# Patient Record
Sex: MALE | Race: WHITE
[De-identification: names, ages, dates, MRNs, and addresses within clinical notes are randomized per-mention and may not be internally consistent; named-entity substitution may affect disease eponyms.]

---

## 2022-10-10 ENCOUNTER — HOSPITAL ENCOUNTER (INPATIENT)
Dept: HOSPITAL 54 - ER | Age: 72
LOS: 3 days | Discharge: NURSING, REHAB OR LONG-TERM CARE | DRG: 640 | End: 2022-10-13
Attending: NURSE PRACTITIONER | Admitting: NURSE PRACTITIONER
Payer: COMMERCIAL

## 2022-10-10 VITALS — SYSTOLIC BLOOD PRESSURE: 117 MMHG | DIASTOLIC BLOOD PRESSURE: 77 MMHG

## 2022-10-10 VITALS — BODY MASS INDEX: 28.49 KG/M2 | HEIGHT: 65 IN | WEIGHT: 171 LBS

## 2022-10-10 DIAGNOSIS — R29.6: ICD-10-CM

## 2022-10-10 DIAGNOSIS — F02.80: ICD-10-CM

## 2022-10-10 DIAGNOSIS — E86.0: Primary | ICD-10-CM
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
## 2022-10-10 DIAGNOSIS — Z20.822: ICD-10-CM

## 2022-10-10 DIAGNOSIS — M62.421: ICD-10-CM

## 2022-10-10 DIAGNOSIS — N17.0: ICD-10-CM

## 2022-10-10 DIAGNOSIS — N18.9: ICD-10-CM

## 2022-10-10 DIAGNOSIS — G93.40: ICD-10-CM

## 2022-10-10 DIAGNOSIS — G20: ICD-10-CM

## 2022-10-10 DIAGNOSIS — N28.1: ICD-10-CM

## 2022-10-10 LAB
ALBUMIN SERPL BCP-MCNC: 3.4 G/DL (ref 3.4–5)
ALP SERPL-CCNC: 101 U/L (ref 46–116)
ALT SERPL W P-5'-P-CCNC: 6 U/L (ref 12–78)
AST SERPL W P-5'-P-CCNC: 18 U/L (ref 15–37)
BASOPHILS # BLD AUTO: 0 K/UL (ref 0–0.2)
BASOPHILS NFR BLD AUTO: 0.3 % (ref 0–2)
BILIRUB DIRECT SERPL-MCNC: 0.2 MG/DL (ref 0–0.2)
BILIRUB SERPL-MCNC: 1 MG/DL (ref 0.2–1)
BUN SERPL-MCNC: 27 MG/DL (ref 7–18)
CALCIUM SERPL-MCNC: 8.9 MG/DL (ref 8.5–10.1)
CHLORIDE SERPL-SCNC: 103 MMOL/L (ref 98–107)
CO2 SERPL-SCNC: 28 MMOL/L (ref 21–32)
CREAT SERPL-MCNC: 1.7 MG/DL (ref 0.6–1.3)
EOSINOPHIL NFR BLD AUTO: 1.7 % (ref 0–6)
GLUCOSE SERPL-MCNC: 139 MG/DL (ref 74–106)
HCT VFR BLD AUTO: 40 % (ref 39–51)
HGB BLD-MCNC: 13.4 G/DL (ref 13.5–17.5)
LYMPHOCYTES NFR BLD AUTO: 1.2 K/UL (ref 0.8–4.8)
LYMPHOCYTES NFR BLD AUTO: 15.3 % (ref 20–44)
MCHC RBC AUTO-ENTMCNC: 34 G/DL (ref 31–36)
MCV RBC AUTO: 97 FL (ref 80–96)
MONOCYTES NFR BLD AUTO: 1 K/UL (ref 0.1–1.3)
MONOCYTES NFR BLD AUTO: 12.8 % (ref 2–12)
NEUTROPHILS # BLD AUTO: 5.6 K/UL (ref 1.8–8.9)
NEUTROPHILS NFR BLD AUTO: 69.9 % (ref 43–81)
PLATELET # BLD AUTO: 216 K/UL (ref 150–450)
POTASSIUM SERPL-SCNC: 3.8 MMOL/L (ref 3.5–5.1)
PROT SERPL-MCNC: 6.6 G/DL (ref 6.4–8.2)
RBC # BLD AUTO: 4.14 MIL/UL (ref 4.5–6)
SODIUM SERPL-SCNC: 137 MMOL/L (ref 136–145)
WBC NRBC COR # BLD AUTO: 8 K/UL (ref 4.3–11)

## 2022-10-10 PROCEDURE — G0378 HOSPITAL OBSERVATION PER HR: HCPCS

## 2022-10-10 PROCEDURE — C9803 HOPD COVID-19 SPEC COLLECT: HCPCS

## 2022-10-10 PROCEDURE — A4349 DISPOSABLE MALE EXTERNAL CAT: HCPCS

## 2022-10-10 NOTE — NUR
AUGUSTUS from [facility] c/o neck pain s/p unwitnessed GLF. PLACED ON BED, AAOX4, 
BREATHING EVEN AND UNLABORED SATURATING AT 97%RA.

## 2022-10-11 VITALS — SYSTOLIC BLOOD PRESSURE: 102 MMHG | DIASTOLIC BLOOD PRESSURE: 67 MMHG

## 2022-10-11 VITALS — DIASTOLIC BLOOD PRESSURE: 72 MMHG | SYSTOLIC BLOOD PRESSURE: 122 MMHG

## 2022-10-11 VITALS — SYSTOLIC BLOOD PRESSURE: 119 MMHG | DIASTOLIC BLOOD PRESSURE: 54 MMHG

## 2022-10-11 VITALS — SYSTOLIC BLOOD PRESSURE: 91 MMHG | DIASTOLIC BLOOD PRESSURE: 67 MMHG

## 2022-10-11 LAB
BASOPHILS # BLD AUTO: 0 K/UL (ref 0–0.2)
BASOPHILS NFR BLD AUTO: 0.5 % (ref 0–2)
BUN SERPL-MCNC: 25 MG/DL (ref 7–18)
CALCIUM SERPL-MCNC: 8.5 MG/DL (ref 8.5–10.1)
CHLORIDE SERPL-SCNC: 103 MMOL/L (ref 98–107)
CO2 SERPL-SCNC: 25 MMOL/L (ref 21–32)
CREAT SERPL-MCNC: 1.5 MG/DL (ref 0.6–1.3)
EOSINOPHIL NFR BLD AUTO: 1.8 % (ref 0–6)
GLUCOSE SERPL-MCNC: 116 MG/DL (ref 74–106)
HCT VFR BLD AUTO: 39 % (ref 39–51)
HGB BLD-MCNC: 13.2 G/DL (ref 13.5–17.5)
LYMPHOCYTES NFR BLD AUTO: 1.1 K/UL (ref 0.8–4.8)
LYMPHOCYTES NFR BLD AUTO: 16.1 % (ref 20–44)
MAGNESIUM SERPL-MCNC: 2 MG/DL (ref 1.8–2.4)
MCHC RBC AUTO-ENTMCNC: 34 G/DL (ref 31–36)
MCV RBC AUTO: 96 FL (ref 80–96)
MONOCYTES NFR BLD AUTO: 0.7 K/UL (ref 0.1–1.3)
MONOCYTES NFR BLD AUTO: 10.8 % (ref 2–12)
NEUTROPHILS # BLD AUTO: 4.7 K/UL (ref 1.8–8.9)
NEUTROPHILS NFR BLD AUTO: 70.8 % (ref 43–81)
PHOSPHATE SERPL-MCNC: 3.3 MG/DL (ref 2.5–4.9)
PLATELET # BLD AUTO: 213 K/UL (ref 150–450)
POTASSIUM SERPL-SCNC: 4.3 MMOL/L (ref 3.5–5.1)
RBC # BLD AUTO: 4.06 MIL/UL (ref 4.5–6)
SODIUM SERPL-SCNC: 136 MMOL/L (ref 136–145)
TSH SERPL DL<=0.005 MIU/L-ACNC: 2.14 UIU/ML (ref 0.36–3.74)
WBC NRBC COR # BLD AUTO: 6.6 K/UL (ref 4.3–11)

## 2022-10-11 RX ADMIN — SODIUM CHLORIDE PRN MLS/HR: 9 INJECTION, SOLUTION INTRAVENOUS at 01:27

## 2022-10-11 RX ADMIN — Medication SCH UDTAB: at 20:48

## 2022-10-11 RX ADMIN — Medication SCH UDTAB: at 11:58

## 2022-10-11 RX ADMIN — CLOTRIMAZOLE SCH GM: 1 CREAM TOPICAL at 17:12

## 2022-10-11 RX ADMIN — CLOZAPINE SCH MG: 25 TABLET ORAL at 21:04

## 2022-10-11 RX ADMIN — PANTOPRAZOLE SODIUM SCH MG: 40 TABLET, DELAYED RELEASE ORAL at 09:08

## 2022-10-11 RX ADMIN — Medication SCH UDTAB: at 14:32

## 2022-10-11 RX ADMIN — Medication SCH UDTAB: at 17:12

## 2022-10-11 RX ADMIN — ENTACAPONE SCH MG: 200 TABLET, FILM COATED ORAL at 12:28

## 2022-10-11 RX ADMIN — CLOTRIMAZOLE SCH GM: 1 CREAM TOPICAL at 09:32

## 2022-10-11 RX ADMIN — ENTACAPONE SCH MG: 200 TABLET, FILM COATED ORAL at 15:06

## 2022-10-11 RX ADMIN — Medication SCH EACH: at 17:12

## 2022-10-11 RX ADMIN — ENTACAPONE SCH MG: 200 TABLET, FILM COATED ORAL at 20:48

## 2022-10-11 RX ADMIN — ENTACAPONE SCH MG: 200 TABLET, FILM COATED ORAL at 17:12

## 2022-10-11 RX ADMIN — OXYCODONE HYDROCHLORIDE AND ACETAMINOPHEN SCH MG: 500 TABLET ORAL at 17:12

## 2022-10-11 NOTE — NUR
RN OPENING NOTE



PATIENT AWAKE IN BED RESTING. A/O X2. NO PAIN NOTED AT THIS TIME. ON ROOM AIR, NO DISTRESS 
OR SHORTNESS OF BREATH NOTED. IV ACCESS LAC #20G, INTACT, PATENT AND FLUSHING WELL. FALL AND 
SAFETY MEASURES IN PLACE, BED ALARM ON, BED IN LOW AND LOCK POSITION, CALL LIGHT AND TABLE 
WITHIN EASY REACH, SIDE RAILS UP X2.

## 2022-10-11 NOTE — NUR
WOUND CARE CONSULT: PT PRESENTS WITH INCONTINENCE, AREAS OF SKIN DISCOLORATION AND RASH TO 
GROIN FOLDS/INNER THIGHS, PRESENT ON ADMISSION. RECOMMENDATIONS MADE FOR SKIN PROTECTION. 
DISCUSSED WITH NURSING STAFF. MD IN AGREEMENT WITH PLAN OF CARE.

## 2022-10-11 NOTE — NUR
RN NOTE



CONDOM CATHETER WAS PLACE ON PATIENT AS PER REQUEST OF WOUND CARE NURSE. CHARGE NURSE AWARE, 
WILL CONTINUE TO MONITOR

## 2022-10-11 NOTE — NUR
RN NOTE



FAMILY GOT HOME MEDICATION NOURIANZ. MEDICATION NOURIANZ 20MG #19 TABLETS WAS GIVEN TO 
PHARMACY. CHARGE NURSE AWARE.

## 2022-10-11 NOTE — NUR
RN OPENING NOTE



PATIENT AWAKE IN BED RESTING. A/O X2. NO PAIN NOTED AT THIS TIME. ON ROOM AIR, NO DISTRESS 
OR SHORTNESS OF BREATH NOTED. IV ACCESS LAC #20G, INTACT, PATENT AND FLUSHING WELL. FALL AND 
SAFETY MEASURES IN PLACE, BED ALARM ON, BED IN LOW AND LOCK POSITION, CALL LIGHT AND TABLE 
WITHIN EASY REACH, SIDE RAILS UP X2. WILL CONTINUE TO MONITOR.

## 2022-10-11 NOTE — NUR
RN CLOSING NOTE



PATIENT AWAKE IN BED RESTING. A/O X2. NO PAIN NOTED AT THIS TIME. ON ROOM AIR, NO DISTRESS 
OR SHORTNESS OF BREATH NOTED. IV ACCESS LAC #20G, INTACT, PATENT AND FLUSHING WELL. SCHEDULE 
MEDICATIONS ADMINISTERED. FALL AND SAFETY MEASURES IN PLACE, BED ALARM ON, BED IN LOW AND 
LOCK POSITION, CALL LIGHT AND TABLE WITHIN EASY REACH, SIDE RAILS UP X2. WILL ENDORSE TO 
NIGHT .

## 2022-10-11 NOTE — NUR
RN CLOSING NOTE 



PT ASLEEP IN BED NO DISTRESS OR PAIN NOTED. PT RUINING [id number] TOLERATING WELL. PT KEPT 
CLEAN AND DRY AT ALL TIMES. ALL NEEDS MET. CALL LIGHT WITHIN REACH. TABLE WITHIN REACH. BED 
IN LOW LOCKED POSITION. HOB ELEVATED FOR SAFETY. WILL ENDORSE CARE TO DAY SHIFT NURSE.

## 2022-10-11 NOTE — NUR
SHARON NOTES



PT ARRIVED TO UNIT  

-------------------------------------------------------------------------------

Addendum: 10/11/22 at 0105 by JEREMIAS STARK RN

-------------------------------------------------------------------------------

PT ARRIVED TO UNIT VIA GURNEY. PT A/O X 2 PT VERY FORGETFUL AND CONFUSED AT TIMES BUT IS 
ABLE TO VERBALIZE HIS NEEDS. PT IN NO PAIN OR DISTRESS AT THIS TIME ON ROOM AIR TOLERATING 
WELL. PT NOTED WITH IV ACCESS ON THE LAC 320G FLUSHING WELL. PT CONNECTED TO NS @75ML/HR 
TOLERATING WELL.PT PT NOTED WITH GENERALIZED REDNESS PHOTOS TAKEN AND PLACE IN CHART WOUND 
CONSULT ORDERED. PT ORIENTED TO ROOM AND UNIT CALL LIGHT WITHIN REACH. TABLE WITHIN REACH. 
BED ON LOW POSITION AND LOCKED IN PLACE. HOB ELEVATED FOR SAFETY BED ALARM ON. ALL NEEDS MET 
AT THIS TIME. NO PT INFORMATION PROVIDED BY [facility] TRIED CALLING X4 TIMES 
NO ANSWER. WILL ENDORSE OT DAY SHIFT TO FOLLOW UP IF UNABLE TO OBTAIN INFORMATION.

## 2022-10-12 VITALS — DIASTOLIC BLOOD PRESSURE: 64 MMHG | SYSTOLIC BLOOD PRESSURE: 98 MMHG

## 2022-10-12 VITALS — SYSTOLIC BLOOD PRESSURE: 96 MMHG | DIASTOLIC BLOOD PRESSURE: 59 MMHG

## 2022-10-12 VITALS — DIASTOLIC BLOOD PRESSURE: 56 MMHG | SYSTOLIC BLOOD PRESSURE: 106 MMHG

## 2022-10-12 LAB
BUN SERPL-MCNC: 16 MG/DL (ref 7–18)
CALCIUM SERPL-MCNC: 8.2 MG/DL (ref 8.5–10.1)
CHLORIDE SERPL-SCNC: 108 MMOL/L (ref 98–107)
CO2 SERPL-SCNC: 25 MMOL/L (ref 21–32)
CREAT SERPL-MCNC: 1.3 MG/DL (ref 0.6–1.3)
GLUCOSE SERPL-MCNC: 93 MG/DL (ref 74–106)
POTASSIUM SERPL-SCNC: 3.8 MMOL/L (ref 3.5–5.1)
SODIUM SERPL-SCNC: 139 MMOL/L (ref 136–145)

## 2022-10-12 RX ADMIN — THERA TABS SCH UDTAB: TAB at 08:50

## 2022-10-12 RX ADMIN — LIDOCAINE SCH EA: 50 PATCH CUTANEOUS at 08:51

## 2022-10-12 RX ADMIN — Medication SCH UDTAB: at 15:05

## 2022-10-12 RX ADMIN — SODIUM CHLORIDE PRN MLS/HR: 9 INJECTION, SOLUTION INTRAVENOUS at 23:52

## 2022-10-12 RX ADMIN — PANTOPRAZOLE SODIUM SCH MG: 40 TABLET, DELAYED RELEASE ORAL at 08:51

## 2022-10-12 RX ADMIN — DONEPEZIL HYDROCHLORIDE SCH MG: 5 TABLET ORAL at 08:50

## 2022-10-12 RX ADMIN — ENTACAPONE SCH MG: 200 TABLET, FILM COATED ORAL at 17:44

## 2022-10-12 RX ADMIN — ENTACAPONE SCH MG: 200 TABLET, FILM COATED ORAL at 11:16

## 2022-10-12 RX ADMIN — VITAMIN D, TAB 1000IU (100/BT) SCH UNIT: 25 TAB at 08:50

## 2022-10-12 RX ADMIN — ENTACAPONE SCH MG: 200 TABLET, FILM COATED ORAL at 06:05

## 2022-10-12 RX ADMIN — Medication SCH EA: at 08:49

## 2022-10-12 RX ADMIN — CLOTRIMAZOLE SCH GM: 1 CREAM TOPICAL at 17:45

## 2022-10-12 RX ADMIN — Medication SCH UDTAB: at 11:16

## 2022-10-12 RX ADMIN — OXYCODONE HYDROCHLORIDE AND ACETAMINOPHEN SCH MG: 500 TABLET ORAL at 17:44

## 2022-10-12 RX ADMIN — Medication SCH UDTAB: at 17:44

## 2022-10-12 RX ADMIN — Medication SCH UDTAB: at 06:05

## 2022-10-12 RX ADMIN — ATORVASTATIN CALCIUM SCH MG: 40 TABLET, FILM COATED ORAL at 08:50

## 2022-10-12 RX ADMIN — CLOZAPINE SCH MG: 25 TABLET ORAL at 21:27

## 2022-10-12 RX ADMIN — Medication SCH UDTAB: at 21:27

## 2022-10-12 RX ADMIN — CLOTRIMAZOLE SCH GM: 1 CREAM TOPICAL at 08:50

## 2022-10-12 RX ADMIN — OXYCODONE HYDROCHLORIDE AND ACETAMINOPHEN SCH MG: 500 TABLET ORAL at 08:50

## 2022-10-12 RX ADMIN — Medication SCH MG: at 08:51

## 2022-10-12 RX ADMIN — ENTACAPONE SCH MG: 200 TABLET, FILM COATED ORAL at 21:27

## 2022-10-12 RX ADMIN — Medication SCH EACH: at 08:50

## 2022-10-12 RX ADMIN — Medication SCH EACH: at 17:44

## 2022-10-12 RX ADMIN — ENTACAPONE SCH MG: 200 TABLET, FILM COATED ORAL at 15:05

## 2022-10-12 NOTE — NUR
MS RN OPENING NOTE



PATIENT AWAKE IN BED RESTING. A/O X2. NO PAIN NOTED AT THIS TIME. ON RA, TOLERATING WELL, NO 
S/S OF ACUTE DISTRESS,  IV ACCESS LAC #20G, INTACT, PATENT RUNNING NS@75ML/HR.. FALL AND 
SAFETY MEASURES IN PLACE, BED ALARM ON, BED IN LOW AND LOCK POSITION, CALL LIGHT AND TABLE 
WITHIN EASY REACH, SIDE RAILS UP X2. WILL CONTINUE TO MONITOR THROUGHOUT SHIFT.

## 2022-10-12 NOTE — NUR
RN CLOSING NOTE



PATIENT AWAKE IN BED RESTING. A/O X2. NO PAIN NOTED AT THIS TIME. ON ROOM AIR, NO DISTRESS 
OR SHORTNESS OF BREATH NOTED. IV ACCESS LAC #20G, INTACT, PATENT AND FLUSHING WELL. FALL AND 
SAFETY MEASURES IN PLACE, BED ALARM ON, BED IN LOW AND LOCK POSITION, CALL LIGHT AND TABLE 
WITHIN EASY REACH, SIDE RAILS UP X2.

## 2022-10-12 NOTE — NUR
RN CLOSING NOTE



PATIENT AWAKE IN BED RESTING. A/O X2. NO PAIN NOTED AT THIS TIME. ON ROOM AIR, NO DISTRESS 
OR SHORTNESS OF BREATH NOTED. IV ACCESS LAC #20G, INTACT, PATENT AND FLUSHING WELL. SCHEDULE 
MEDICATIONS ADMINISTERED. PATIENT WAS TURNED AND REPOSITIONED PER PROTOCOL. FALL AND SAFETY 
MEASURES IN PLACE, BED ALARM ON, BED IN LOW AND LOCK POSITION, CALL LIGHT AND TABLE WITHIN 
EASY REACH, SIDE RAILS UP X2. WILL ENDORSE TO NIGHT

## 2022-10-13 VITALS — DIASTOLIC BLOOD PRESSURE: 62 MMHG | SYSTOLIC BLOOD PRESSURE: 129 MMHG

## 2022-10-13 VITALS — DIASTOLIC BLOOD PRESSURE: 74 MMHG | SYSTOLIC BLOOD PRESSURE: 124 MMHG

## 2022-10-13 RX ADMIN — Medication SCH UDTAB: at 11:14

## 2022-10-13 RX ADMIN — DONEPEZIL HYDROCHLORIDE SCH MG: 5 TABLET ORAL at 08:27

## 2022-10-13 RX ADMIN — ENTACAPONE SCH MG: 200 TABLET, FILM COATED ORAL at 06:50

## 2022-10-13 RX ADMIN — Medication SCH UDTAB: at 06:50

## 2022-10-13 RX ADMIN — Medication SCH EACH: at 08:26

## 2022-10-13 RX ADMIN — ATORVASTATIN CALCIUM SCH MG: 40 TABLET, FILM COATED ORAL at 08:27

## 2022-10-13 RX ADMIN — PANTOPRAZOLE SODIUM SCH MG: 40 TABLET, DELAYED RELEASE ORAL at 07:17

## 2022-10-13 RX ADMIN — THERA TABS SCH UDTAB: TAB at 08:26

## 2022-10-13 RX ADMIN — Medication SCH MG: at 08:26

## 2022-10-13 RX ADMIN — Medication SCH EA: at 08:31

## 2022-10-13 RX ADMIN — ENTACAPONE SCH MG: 200 TABLET, FILM COATED ORAL at 11:14

## 2022-10-13 RX ADMIN — CLOTRIMAZOLE SCH GM: 1 CREAM TOPICAL at 11:15

## 2022-10-13 RX ADMIN — OXYCODONE HYDROCHLORIDE AND ACETAMINOPHEN SCH MG: 500 TABLET ORAL at 08:26

## 2022-10-13 RX ADMIN — LIDOCAINE SCH EA: 50 PATCH CUTANEOUS at 08:27

## 2022-10-13 RX ADMIN — VITAMIN D, TAB 1000IU (100/BT) SCH UNIT: 25 TAB at 08:26

## 2022-10-13 NOTE — NUR
RN NOTES

FIANCE OF THE PATIENT CALLED AT 1530 AND ASKING FOR THE SHOES. I STATED PERSONALLY HANDED 
THE BLACK TENNIS SHOES IN A WHITE COLOR PAPER BAG IN A BELONGING PLASTIC BAG TO THE 
TRANSPORTATION STAFF.

## 2022-10-13 NOTE — NUR
MS RN OPENING NOTE



RECEIVED PATIENT AWAKE IN BED . A/O X2. NO PAIN NOTED AT THIS TIME. ON ROOM AIR, NO DISTRESS 
OR SHORTNESS OF BREATH NOTED. IV ACCESS LAC #20G, INTACT, PATENT AND FLUSHING WELL. ON 
CONTINUOUS NS AT 75 ML/HR. FALL AND SAFETY MEASURES IN PLACE, BED ALARM ON, BED IN LOW AND 
LOCK POSITION, CALL LIGHT AND TABLE WITHIN EASY REACH, SIDE RAILS UP X2. WILL CONTINUE TO 
MONITOR.

## 2022-10-13 NOTE — NUR
RN NOTES

[facility]BER [phone] AT 2454 AND GAVE REPORT TO 
BOAZ. THE PATIENT WILL BE ADMITTED TO ROOM 29 B.

## 2022-10-13 NOTE — NUR
MS RN CLOSING NOTE



PATIENT AWAKE IN BED RESTING. A/O X2. NO PAIN NOTED AT THIS TIME. ON RA, TOLERATING WELL, NO 
S/S OF ACUTE DISTRESS,  IV ACCESS LAC #20G, INTACT, PATENT RUNNING NS@75ML/HR..ALL DUE MEDS 
GIVEN. FALL AND SAFETY MEASURES IN PLACE, BED ALARM ON, BED IN LOW AND LOCK POSITION, CALL 
LIGHT AND TABLE WITHIN EASY REACH, SIDE RAILS UP X2. WILL CONTINUE ENDORSE TO MORNING SHIFT

## 2022-10-13 NOTE — NUR
RN NOTES

DISCHARGE PATIENT IN STABLE CONDITION WITH STABLE VITAL SIGNS. NO PAIN NOTED. NO SOB NOTED. 
NO DISTRESS NOTED. ALL THE DISCHARGE INSTRUCTION GIVEN TO THE [facility] NURSE 
BOAZ. ALL THE BELONGINGS WHICH WERE CELL PHONE, , 1 PAIR OF BLACK TENNIS SHOES IN 
A WHITE PAPER BAG, SHIRT, PANTS, AND SOCKS GIVEN TO THE TRANSPORTATION STAFF. ALL THE 
BELONGINGS ACCOUNTED AND SIGNED WITH 2 NURSE WITNESS. IV SITE REMOVED COVERED WITH DRY 
DRESSING. NO BLEEDING NOTED. ID BAND REMOVED. PATIENT LEFT HOSPITAL IN STABLE CONDITION WITH 
STABLE VITAL SIGNS AT 1335 PM. MD AND CHARGE NURSE AWARE OF THE DISCHARGE.

## 2022-12-17 ENCOUNTER — HOSPITAL ENCOUNTER (EMERGENCY)
Dept: HOSPITAL 54 - ER | Age: 72
Discharge: NURSING, REHAB OR LONG-TERM CARE | End: 2022-12-17
Payer: COMMERCIAL

## 2022-12-17 VITALS — BODY MASS INDEX: 22.91 KG/M2 | HEIGHT: 67 IN | WEIGHT: 146 LBS

## 2022-12-17 VITALS — DIASTOLIC BLOOD PRESSURE: 68 MMHG | SYSTOLIC BLOOD PRESSURE: 101 MMHG

## 2022-12-17 DIAGNOSIS — Z79.899: ICD-10-CM

## 2022-12-17 DIAGNOSIS — Z87.440: ICD-10-CM

## 2022-12-17 DIAGNOSIS — Z79.82: ICD-10-CM

## 2022-12-17 DIAGNOSIS — F02.82: ICD-10-CM

## 2022-12-17 DIAGNOSIS — G20: Primary | ICD-10-CM

## 2022-12-17 DIAGNOSIS — Z20.822: ICD-10-CM

## 2022-12-17 DIAGNOSIS — R53.83: ICD-10-CM

## 2022-12-17 LAB
ALBUMIN SERPL BCP-MCNC: 3.5 G/DL (ref 3.4–5)
ALP SERPL-CCNC: 80 U/L (ref 46–116)
ALT SERPL W P-5'-P-CCNC: < 6 U/L (ref 12–78)
APAP SERPL-MCNC: 0 UG/ML (ref 10–30)
AST SERPL W P-5'-P-CCNC: 21 U/L (ref 15–37)
BASOPHILS # BLD AUTO: 0 K/UL (ref 0–0.2)
BASOPHILS NFR BLD AUTO: 0.3 % (ref 0–2)
BILIRUB DIRECT SERPL-MCNC: 0.2 MG/DL (ref 0–0.2)
BILIRUB SERPL-MCNC: 0.7 MG/DL (ref 0.2–1)
BILIRUB UR QL STRIP: NEGATIVE
BUN SERPL-MCNC: 19 MG/DL (ref 7–18)
CALCIUM SERPL-MCNC: 8.4 MG/DL (ref 8.5–10.1)
CHLORIDE SERPL-SCNC: 105 MMOL/L (ref 98–107)
CO2 SERPL-SCNC: 28 MMOL/L (ref 21–32)
COLOR UR: YELLOW
CREAT SERPL-MCNC: 1.2 MG/DL (ref 0.6–1.3)
EOSINOPHIL NFR BLD AUTO: 8.7 % (ref 0–6)
ETHANOL SERPL-MCNC: < 3 MG/DL (ref 0–0)
GLUCOSE SERPL-MCNC: 95 MG/DL (ref 74–106)
GLUCOSE UR STRIP-MCNC: NEGATIVE MG/DL
HCT VFR BLD AUTO: 41 % (ref 39–51)
HGB BLD-MCNC: 13.5 G/DL (ref 13.5–17.5)
LEUKOCYTE ESTERASE UR QL STRIP: NEGATIVE
LYMPHOCYTES NFR BLD AUTO: 1.2 K/UL (ref 0.8–4.8)
LYMPHOCYTES NFR BLD AUTO: 13.1 % (ref 20–44)
MCHC RBC AUTO-ENTMCNC: 33 G/DL (ref 31–36)
MCV RBC AUTO: 97 FL (ref 80–96)
MONOCYTES NFR BLD AUTO: 0.8 K/UL (ref 0.1–1.3)
MONOCYTES NFR BLD AUTO: 8.5 % (ref 2–12)
NEUTROPHILS # BLD AUTO: 6.2 K/UL (ref 1.8–8.9)
NEUTROPHILS NFR BLD AUTO: 69.4 % (ref 43–81)
NITRITE UR QL STRIP: NEGATIVE
PH UR STRIP: 6 [PH] (ref 5–8)
PLATELET # BLD AUTO: 217 K/UL (ref 150–450)
POTASSIUM SERPL-SCNC: 4 MMOL/L (ref 3.5–5.1)
PROT SERPL-MCNC: 6.7 G/DL (ref 6.4–8.2)
PROT UR QL STRIP: NEGATIVE MG/DL
RBC # BLD AUTO: 4.21 MIL/UL (ref 4.5–6)
RBC #/AREA URNS HPF: (no result) /HPF (ref 0–2)
SODIUM SERPL-SCNC: 139 MMOL/L (ref 136–145)
UROBILINOGEN UR STRIP-MCNC: 1 EU/DL
WBC #/AREA URNS HPF: (no result) /HPF (ref 0–3)
WBC NRBC COR # BLD AUTO: 8.9 K/UL (ref 4.3–11)

## 2022-12-17 PROCEDURE — G0480 DRUG TEST DEF 1-7 CLASSES: HCPCS

## 2022-12-17 PROCEDURE — 70450 CT HEAD/BRAIN W/O DYE: CPT

## 2022-12-17 PROCEDURE — 87081 CULTURE SCREEN ONLY: CPT

## 2022-12-17 PROCEDURE — 80076 HEPATIC FUNCTION PANEL: CPT

## 2022-12-17 PROCEDURE — 36415 COLL VENOUS BLD VENIPUNCTURE: CPT

## 2022-12-17 PROCEDURE — 80320 DRUG SCREEN QUANTALCOHOLS: CPT

## 2022-12-17 PROCEDURE — 81001 URINALYSIS AUTO W/SCOPE: CPT

## 2022-12-17 PROCEDURE — 99284 EMERGENCY DEPT VISIT MOD MDM: CPT

## 2022-12-17 PROCEDURE — C9803 HOPD COVID-19 SPEC COLLECT: HCPCS

## 2022-12-17 PROCEDURE — 80048 BASIC METABOLIC PNL TOTAL CA: CPT

## 2022-12-17 PROCEDURE — 85025 COMPLETE CBC W/AUTO DIFF WBC: CPT

## 2022-12-17 PROCEDURE — 80307 DRUG TEST PRSMV CHEM ANLYZR: CPT

## 2022-12-17 PROCEDURE — 87426 SARSCOV CORONAVIRUS AG IA: CPT

## 2022-12-17 PROCEDURE — 80143 DRUG ASSAY ACETAMINOPHEN: CPT

## 2022-12-17 NOTE — NUR
REPORT GIVEN TO [facility]. [facility] AT PT'S 
BEDSIDE

## 2023-02-02 ENCOUNTER — HOSPITAL ENCOUNTER (EMERGENCY)
Dept: HOSPITAL 54 - ER | Age: 73
Discharge: HOME | End: 2023-02-02
Payer: MEDICARE

## 2023-02-02 VITALS — WEIGHT: 154 LBS | HEIGHT: 68 IN | BODY MASS INDEX: 23.34 KG/M2

## 2023-02-02 VITALS — SYSTOLIC BLOOD PRESSURE: 112 MMHG | DIASTOLIC BLOOD PRESSURE: 66 MMHG

## 2023-02-02 DIAGNOSIS — Z79.899: ICD-10-CM

## 2023-02-02 DIAGNOSIS — N30.91: Primary | ICD-10-CM

## 2023-02-02 DIAGNOSIS — G20: ICD-10-CM

## 2023-02-02 DIAGNOSIS — Z98.890: ICD-10-CM

## 2023-02-02 DIAGNOSIS — R10.12: ICD-10-CM

## 2023-02-02 LAB
ALBUMIN SERPL BCP-MCNC: 3.1 G/DL (ref 3.4–5)
ALP SERPL-CCNC: 104 U/L (ref 46–116)
ALT SERPL W P-5'-P-CCNC: 11 U/L (ref 12–78)
AST SERPL W P-5'-P-CCNC: 19 U/L (ref 15–37)
BASOPHILS # BLD AUTO: 0.1 K/UL (ref 0–0.2)
BASOPHILS NFR BLD AUTO: 0.8 % (ref 0–2)
BILIRUB DIRECT SERPL-MCNC: 0.1 MG/DL (ref 0–0.2)
BILIRUB SERPL-MCNC: 0.5 MG/DL (ref 0.2–1)
BILIRUB UR QL STRIP: (no result)
BUN SERPL-MCNC: 23 MG/DL (ref 7–18)
CALCIUM SERPL-MCNC: 8.4 MG/DL (ref 8.5–10.1)
CHLORIDE SERPL-SCNC: 105 MMOL/L (ref 98–107)
CO2 SERPL-SCNC: 25 MMOL/L (ref 21–32)
COLOR UR: YELLOW
CREAT SERPL-MCNC: 1.5 MG/DL (ref 0.6–1.3)
DEPRECATED SQUAMOUS URNS QL MICRO: (no result) /HPF
EOSINOPHIL NFR BLD AUTO: 16.1 % (ref 0–6)
GLUCOSE SERPL-MCNC: 90 MG/DL (ref 74–106)
GLUCOSE UR STRIP-MCNC: NEGATIVE MG/DL
HCT VFR BLD AUTO: 43 % (ref 39–51)
HGB BLD-MCNC: 14.2 G/DL (ref 13.5–17.5)
LEUKOCYTE ESTERASE UR QL STRIP: (no result)
LIPASE SERPL-CCNC: 76 U/L (ref 73–393)
LYMPHOCYTES NFR BLD AUTO: 1.5 K/UL (ref 0.8–4.8)
LYMPHOCYTES NFR BLD AUTO: 11 % (ref 20–44)
MCHC RBC AUTO-ENTMCNC: 33 G/DL (ref 31–36)
MCV RBC AUTO: 95 FL (ref 80–96)
MONOCYTES NFR BLD AUTO: 1.5 K/UL (ref 0.1–1.3)
MONOCYTES NFR BLD AUTO: 11.4 % (ref 2–12)
NEUTROPHILS # BLD AUTO: 8.1 K/UL (ref 1.8–8.9)
NEUTROPHILS NFR BLD AUTO: 60.7 % (ref 43–81)
NITRITE UR QL STRIP: NEGATIVE
PH UR STRIP: 8.5 [PH] (ref 5–8)
PLATELET # BLD AUTO: 222 K/UL (ref 150–450)
POTASSIUM SERPL-SCNC: 4.2 MMOL/L (ref 3.5–5.1)
PROT SERPL-MCNC: 6.6 G/DL (ref 6.4–8.2)
PROT UR QL STRIP: (no result) MG/DL
RBC # BLD AUTO: 4.53 MIL/UL (ref 4.5–6)
RBC #/AREA URNS HPF: (no result) /HPF (ref 0–2)
SODIUM SERPL-SCNC: 138 MMOL/L (ref 136–145)
UROBILINOGEN UR STRIP-MCNC: 0.2 EU/DL
WBC #/AREA URNS HPF: (no result) /HPF
WBC #/AREA URNS HPF: (no result) /HPF (ref 0–3)
WBC NRBC COR # BLD AUTO: 13.3 K/UL (ref 4.3–11)

## 2023-04-27 ENCOUNTER — HOSPITAL ENCOUNTER (INPATIENT)
Dept: HOSPITAL 54 - ER | Age: 73
LOS: 4 days | Discharge: NURSING, REHAB OR LONG-TERM CARE | DRG: 871 | End: 2023-05-01
Attending: NURSE PRACTITIONER | Admitting: NURSE PRACTITIONER
Payer: MEDICARE

## 2023-04-27 VITALS — DIASTOLIC BLOOD PRESSURE: 69 MMHG | SYSTOLIC BLOOD PRESSURE: 118 MMHG

## 2023-04-27 VITALS — BODY MASS INDEX: 25.05 KG/M2 | WEIGHT: 175 LBS | HEIGHT: 70 IN
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
## 2023-04-27 DIAGNOSIS — D68.59: ICD-10-CM

## 2023-04-27 DIAGNOSIS — T78.40XA: ICD-10-CM

## 2023-04-27 DIAGNOSIS — B96.89: ICD-10-CM

## 2023-04-27 DIAGNOSIS — Z79.899: ICD-10-CM

## 2023-04-27 DIAGNOSIS — R62.7: ICD-10-CM

## 2023-04-27 DIAGNOSIS — J15.9: ICD-10-CM

## 2023-04-27 DIAGNOSIS — Z98.890: ICD-10-CM

## 2023-04-27 DIAGNOSIS — N39.0: ICD-10-CM

## 2023-04-27 DIAGNOSIS — E44.0: ICD-10-CM

## 2023-04-27 DIAGNOSIS — A41.9: Primary | ICD-10-CM

## 2023-04-27 DIAGNOSIS — E11.9: ICD-10-CM

## 2023-04-27 DIAGNOSIS — E88.09: ICD-10-CM

## 2023-04-27 DIAGNOSIS — F02.80: ICD-10-CM

## 2023-04-27 DIAGNOSIS — R79.89: ICD-10-CM

## 2023-04-27 DIAGNOSIS — G93.41: ICD-10-CM

## 2023-04-27 DIAGNOSIS — J69.0: ICD-10-CM

## 2023-04-27 DIAGNOSIS — M62.421: ICD-10-CM

## 2023-04-27 DIAGNOSIS — I48.92: ICD-10-CM

## 2023-04-27 DIAGNOSIS — U09.9: ICD-10-CM

## 2023-04-27 DIAGNOSIS — Z88.1: ICD-10-CM

## 2023-04-27 DIAGNOSIS — Z79.82: ICD-10-CM

## 2023-04-27 DIAGNOSIS — B86: ICD-10-CM

## 2023-04-27 DIAGNOSIS — E87.6: ICD-10-CM

## 2023-04-27 DIAGNOSIS — Z20.822: ICD-10-CM

## 2023-04-27 DIAGNOSIS — Z74.09: ICD-10-CM

## 2023-04-27 DIAGNOSIS — K57.30: ICD-10-CM

## 2023-04-27 DIAGNOSIS — G20: ICD-10-CM

## 2023-04-27 DIAGNOSIS — I48.91: ICD-10-CM

## 2023-04-27 LAB
ALBUMIN SERPL BCP-MCNC: 3.2 G/DL (ref 3.4–5)
ALP SERPL-CCNC: 107 U/L (ref 46–116)
ALT SERPL W P-5'-P-CCNC: 10 U/L (ref 12–78)
AMMONIA PLAS-SCNC: 21 UMOL/L (ref 11–32)
APAP SERPL-MCNC: 0 UG/ML (ref 10–30)
AST SERPL W P-5'-P-CCNC: 18 U/L (ref 15–37)
BASOPHILS # BLD AUTO: 0.1 K/UL (ref 0–0.2)
BASOPHILS NFR BLD AUTO: 0.6 % (ref 0–2)
BILIRUB DIRECT SERPL-MCNC: 0.1 MG/DL (ref 0–0.2)
BILIRUB SERPL-MCNC: 0.7 MG/DL (ref 0.2–1)
BILIRUB UR QL STRIP: NEGATIVE
BUN SERPL-MCNC: 19 MG/DL (ref 7–18)
CALCIUM SERPL-MCNC: 8.6 MG/DL (ref 8.5–10.1)
CHLORIDE SERPL-SCNC: 107 MMOL/L (ref 98–107)
CO2 SERPL-SCNC: 26 MMOL/L (ref 21–32)
COLOR UR: YELLOW
CREAT SERPL-MCNC: 1.3 MG/DL (ref 0.6–1.3)
DEPRECATED SQUAMOUS URNS QL MICRO: (no result) /HPF
EOSINOPHIL NFR BLD AUTO: 0.4 % (ref 0–6)
ETHANOL SERPL-MCNC: < 3 MG/DL (ref 0–0)
GLUCOSE SERPL-MCNC: 127 MG/DL (ref 74–106)
GLUCOSE UR STRIP-MCNC: NEGATIVE MG/DL
HCT VFR BLD AUTO: 40 % (ref 39–51)
HGB BLD-MCNC: 13.3 G/DL (ref 13.5–17.5)
LEUKOCYTE ESTERASE UR QL STRIP: (no result)
LYMPHOCYTES NFR BLD AUTO: 1.5 K/UL (ref 0.8–4.8)
LYMPHOCYTES NFR BLD AUTO: 12 % (ref 20–44)
MCHC RBC AUTO-ENTMCNC: 33 G/DL (ref 31–36)
MCV RBC AUTO: 94 FL (ref 80–96)
MONOCYTES NFR BLD AUTO: 1.3 K/UL (ref 0.1–1.3)
MONOCYTES NFR BLD AUTO: 10.2 % (ref 2–12)
NEUTROPHILS # BLD AUTO: 9.4 K/UL (ref 1.8–8.9)
NEUTROPHILS NFR BLD AUTO: 76.8 % (ref 43–81)
NITRITE UR QL STRIP: NEGATIVE
PH UR STRIP: 8.5 [PH] (ref 5–8)
PLATELET # BLD AUTO: 211 K/UL (ref 150–450)
POTASSIUM SERPL-SCNC: 4.2 MMOL/L (ref 3.5–5.1)
PROT SERPL-MCNC: 6.3 G/DL (ref 6.4–8.2)
PROT UR QL STRIP: (no result) MG/DL
RBC # BLD AUTO: 4.24 MIL/UL (ref 4.5–6)
RBC #/AREA URNS HPF: (no result) /HPF (ref 0–2)
SODIUM SERPL-SCNC: 141 MMOL/L (ref 136–145)
TSH SERPL DL<=0.005 MIU/L-ACNC: 1.23 UIU/ML (ref 0.36–3.74)
UROBILINOGEN UR STRIP-MCNC: 0.2 EU/DL
WBC #/AREA URNS HPF: (no result) /HPF
WBC #/AREA URNS HPF: (no result) /HPF (ref 0–3)
WBC NRBC COR # BLD AUTO: 12.3 K/UL (ref 4.3–11)

## 2023-04-27 PROCEDURE — C9803 HOPD COVID-19 SPEC COLLECT: HCPCS

## 2023-04-27 PROCEDURE — G0378 HOSPITAL OBSERVATION PER HR: HCPCS

## 2023-04-27 PROCEDURE — G0480 DRUG TEST DEF 1-7 CLASSES: HCPCS

## 2023-04-27 PROCEDURE — C9113 INJ PANTOPRAZOLE SODIUM, VIA: HCPCS

## 2023-04-27 PROCEDURE — A4223 INFUSION SUPPLIES W/O PUMP: HCPCS

## 2023-04-27 PROCEDURE — A4349 DISPOSABLE MALE EXTERNAL CAT: HCPCS

## 2023-04-27 RX ADMIN — ENOXAPARIN SODIUM SCH MG: 40 INJECTION SUBCUTANEOUS at 20:56

## 2023-04-27 RX ADMIN — MEROPENEM SCH MLS/HR: 500 INJECTION INTRAVENOUS at 23:12

## 2023-04-27 NOTE — NUR
RN NOTE





PT IN BED A/O X1 ORIENTED TO PERSON. PT IN NO PAIN OF DISCOMFORT. NO RESPIRATORY DISTRESS. 
ON ROOM AIR TOLERATING WELL.NO USE OF ASSERTORY MUSCLES NOTED. PT NOTED WITH IV ACCESS ON 
THE RFA #18G RUNNING NS @125ML/HR TOLERATING WELL. NOTED PT WITH  GENERALIZED REDNESS OF THE 
SKIN, FACE APPEARS FLUSHED. REDNESS NOTE DON THE SACRUM AND SCROTUM. NOTED SCABS ALL OVER 
BODY THAT RESEMBLE THOSE OF SCABIES. PT WAS SEEN BY ID NAHOMI DAIGLE.PT PLACED ON ISOLATION. 
ELIMITE CREAM ORDERED. WILL ADMINISTER WHEN AVAILABLE.  PHOTOS TAKEN AND PLACES IN CHART 
WOUND CONSULT ORDERED. PT PLACED ON TELE MONITOR. ORIENTED TO ROOM AND UNIT. SAFETY 
PRECAUTIONS MAINTAINED HOB ELEVATED FOR ASPIRATION PRECAUTIONS. BED IN LOW LOCKED POSITION. 
SIDE RAILS UP X3.

## 2023-04-28 VITALS — DIASTOLIC BLOOD PRESSURE: 86 MMHG | SYSTOLIC BLOOD PRESSURE: 109 MMHG

## 2023-04-28 VITALS — DIASTOLIC BLOOD PRESSURE: 79 MMHG | SYSTOLIC BLOOD PRESSURE: 125 MMHG

## 2023-04-28 VITALS — DIASTOLIC BLOOD PRESSURE: 76 MMHG | SYSTOLIC BLOOD PRESSURE: 119 MMHG

## 2023-04-28 VITALS — SYSTOLIC BLOOD PRESSURE: 112 MMHG | DIASTOLIC BLOOD PRESSURE: 70 MMHG

## 2023-04-28 VITALS — DIASTOLIC BLOOD PRESSURE: 78 MMHG | SYSTOLIC BLOOD PRESSURE: 124 MMHG

## 2023-04-28 VITALS — SYSTOLIC BLOOD PRESSURE: 124 MMHG | DIASTOLIC BLOOD PRESSURE: 78 MMHG

## 2023-04-28 VITALS — SYSTOLIC BLOOD PRESSURE: 119 MMHG | DIASTOLIC BLOOD PRESSURE: 76 MMHG

## 2023-04-28 LAB
BASOPHILS # BLD AUTO: 0.1 K/UL (ref 0–0.2)
BASOPHILS NFR BLD AUTO: 1.1 % (ref 0–2)
BUN SERPL-MCNC: 14 MG/DL (ref 7–18)
CALCIUM SERPL-MCNC: 8.1 MG/DL (ref 8.5–10.1)
CHLORIDE SERPL-SCNC: 109 MMOL/L (ref 98–107)
CHOLEST SERPL-MCNC: 146 MG/DL (ref ?–200)
CO2 SERPL-SCNC: 26 MMOL/L (ref 21–32)
CREAT SERPL-MCNC: 1.2 MG/DL (ref 0.6–1.3)
EOSINOPHIL NFR BLD AUTO: 1.3 % (ref 0–6)
GLUCOSE SERPL-MCNC: 101 MG/DL (ref 74–106)
HCT VFR BLD AUTO: 38 % (ref 39–51)
HDLC SERPL-MCNC: 52 MG/DL (ref 40–60)
HGB BLD-MCNC: 12.9 G/DL (ref 13.5–17.5)
LDLC SERPL DIRECT ASSAY-MCNC: 85 MG/DL (ref 0–99)
LYMPHOCYTES NFR BLD AUTO: 1.1 K/UL (ref 0.8–4.8)
LYMPHOCYTES NFR BLD AUTO: 17.2 % (ref 20–44)
MAGNESIUM SERPL-MCNC: 2 MG/DL (ref 1.8–2.4)
MCHC RBC AUTO-ENTMCNC: 34 G/DL (ref 31–36)
MCV RBC AUTO: 93 FL (ref 80–96)
MONOCYTES NFR BLD AUTO: 0.7 K/UL (ref 0.1–1.3)
MONOCYTES NFR BLD AUTO: 10.7 % (ref 2–12)
NEUTROPHILS # BLD AUTO: 4.5 K/UL (ref 1.8–8.9)
NEUTROPHILS NFR BLD AUTO: 69.7 % (ref 43–81)
PHOSPHATE SERPL-MCNC: 3.3 MG/DL (ref 2.5–4.9)
PLATELET # BLD AUTO: 190 K/UL (ref 150–450)
POTASSIUM SERPL-SCNC: 3.8 MMOL/L (ref 3.5–5.1)
RBC # BLD AUTO: 4.09 MIL/UL (ref 4.5–6)
SODIUM SERPL-SCNC: 141 MMOL/L (ref 136–145)
TRIGL SERPL-MCNC: 179 MG/DL (ref 30–150)
WBC NRBC COR # BLD AUTO: 6.4 K/UL (ref 4.3–11)

## 2023-04-28 RX ADMIN — Medication SCH OZ: at 13:41

## 2023-04-28 RX ADMIN — LORATADINE SCH MG: 10 TABLET ORAL at 10:31

## 2023-04-28 RX ADMIN — SODIUM CHLORIDE PRN MLS/HR: 9 INJECTION, SOLUTION INTRAVENOUS at 03:03

## 2023-04-28 RX ADMIN — SODIUM CHLORIDE PRN MLS/HR: 9 INJECTION, SOLUTION INTRAVENOUS at 09:25

## 2023-04-28 RX ADMIN — MEROPENEM SCH MLS/HR: 500 INJECTION INTRAVENOUS at 21:21

## 2023-04-28 RX ADMIN — SODIUM CHLORIDE SCH MG: 9 INJECTION, SOLUTION INTRAVENOUS at 09:25

## 2023-04-28 RX ADMIN — DEXTROSE MONOHYDRATE SCH MLS/HR: 50 INJECTION, SOLUTION INTRAVENOUS at 09:24

## 2023-04-28 RX ADMIN — MEROPENEM SCH MLS/HR: 500 INJECTION INTRAVENOUS at 13:38

## 2023-04-28 RX ADMIN — DEXTROSE MONOHYDRATE SCH MLS/HR: 50 INJECTION, SOLUTION INTRAVENOUS at 21:57

## 2023-04-28 RX ADMIN — SODIUM CHLORIDE PRN MLS/HR: 9 INJECTION, SOLUTION INTRAVENOUS at 23:21

## 2023-04-28 RX ADMIN — MEROPENEM SCH MLS/HR: 500 INJECTION INTRAVENOUS at 05:00

## 2023-04-28 RX ADMIN — ENOXAPARIN SODIUM SCH MG: 40 INJECTION SUBCUTANEOUS at 21:40

## 2023-04-28 NOTE — NUR
TELE RN CLOSING NOTE (DAY SHIFT)



PT IN BED A/O X 1 ORIENTED TO PERSON. PT HAS NO SIGNS OF PAIN NOR DISCOMFORT. NO RESPIRATORY 
DISTRESS. ON ROOM AIR TOLERATING WELL.  NO USE OF ASSERTORY MUSCLES NOTED. ls AUSCULTATED 
CRACKLES IN BASES.   PT NOTED WITH IV ACCESS ON THE RFA #18G INFUSING NS @125ML/HR 
TOLERATING WELL. PT ON TELE MONITOR SHOWING SINUS RHYTHYM IN 70s BPM RANGE. SAFETY 
PRECAUTIONS MAINTAINED HOB ELEVATED FOR ASPIRATION PRECAUTIONS. PATIENT PASSED SWALLOW 
EVALUATION WITH SPEECH THERAPIST WITH ORDER FOR MECHANICAL SOFT DIET AND NECTAR THICKENED 
LIQUIDS AND NO STRAWS, TO CONTINUE ON ASPIRATION PRECAUTIONS.  BED IN LOW LOCKED POSITION. 
SIDE RAILS UP X 3. MAINTAINED ON CONTACT ISOLATION FOR SUSPECTED SCABIES. WILL ENDORSE TO 
NIGHT SHIFT RNKATHRYN, FOR KEVYN.

## 2023-04-28 NOTE — NUR
RN OPENING NOTE

RECEIVED PATIENT IN BED; AWAKE, ALERT AND ORIENTED X 1. ORIENTED TO PERSON. ON ROOM AIR; 
TOLERATING WELL. BREATHING EVEN AND NONLABORED. NO S/S OF PAIN OR DISCOMFORT NOTED AT THIS 
TIME. ON TELE MONITORING WHICH READS SINUS RHYTHM HR-76 BPM. WITH IV ACCESS ON LEFT FOREARM 
18g; PATENT AND INTACT INFUSING WITH NS 1L REGULATED @ 125 ML/HR; FLUSHES WELL. SAFETY AND 
ASPIRATION PRECAUTIONS IMPLEMENTED: HOB ELEVATED, CALL LIGHT AND TABLE WITHIN REACH, SIDE 
RAILS UP X 3, BED IN LOWEST LOCKED POSITION. WILL CONTINUE TO MONITOR THROUGHOUT SHIFT.

## 2023-04-28 NOTE — NUR
RN NOTE





PT IN BED A/O X1 ORIENTED TO PERSON. PT IN NO PAIN OF DISCOMFORT. NO RESPIRATORY DISTRESS. 
ON ROOM AIR TOLERATING WELL.NO USE OF ASSERTORY MUSCLES NOTED. PT NOTED WITH IV ACCESS ON 
THE RFA #18G RUNNING NS @125ML/HR TOLERATING WELL.PT PLACED ON TELE MONITOR. ORIENTED TO 
ROOM AND UNIT. SAFETY PRECAUTIONS MAINTAINED HOB ELEVATED FOR ASPIRATION PRECAUTIONS. BED IN 
LOW LOCKED POSITION. SIDE RAILS UP X3.

## 2023-04-28 NOTE — NUR
TELE RN OPENING NOTE (DAY SHIFT)

PT IN BED A/O X 1 ORIENTED TO PERSON. PT HAS NO SIGNS OF  NO PAIN NOR DISCOMFORT. NO 
RESPIRATORY DISTRESS. ON ROOM AIR TOLERATING WELL.NO USE OF ASSERTORY MUSCLES NOTED. PT 
NOTED WITH IV ACCESS ON THE RFA #18G INFUSING NS @125ML/HR TOLERATING WELL. PT ON TELE 
MONITOR SHOWING CONTROLLED A-FIB IN 80s BPM RANGE. SAFETY PRECAUTIONS MAINTAINED HOB 
ELEVATED FOR ASPIRATION PRECAUTIONS. BED IN LOW LOCKED POSITION. SIDE RAILS UP X3. 
MAINTAINED ON CONTACT ISOLATION FOR SUSPECTED SCABIES.  WILL CONTINUE TO MONITOR AND CARE 
FOR PT PER MD POC.

## 2023-04-28 NOTE — NUR
WOUND CARE CONSULT: PT PRESENTS WITH IMMOBILITY AND INCONTINENCE AS WELL AS SKIN CONDITION 
WITH RASH/RED SPOTS, PRESENT ON ADMISSION. PER NURSING STAFF, PT WAS TREATED WITH ELIMITE. 
DISCUSSED SKIN PROTECTION WITH NURSING STAFF. MD IN AGREEMENT WITH PLAN OF CARE. PT WAS 
PLACED ON ISOFLEX LOW AIRLOSS BED.

## 2023-04-29 VITALS — DIASTOLIC BLOOD PRESSURE: 72 MMHG | SYSTOLIC BLOOD PRESSURE: 109 MMHG

## 2023-04-29 VITALS — DIASTOLIC BLOOD PRESSURE: 64 MMHG | SYSTOLIC BLOOD PRESSURE: 124 MMHG

## 2023-04-29 VITALS — DIASTOLIC BLOOD PRESSURE: 80 MMHG | SYSTOLIC BLOOD PRESSURE: 121 MMHG

## 2023-04-29 VITALS — DIASTOLIC BLOOD PRESSURE: 78 MMHG | SYSTOLIC BLOOD PRESSURE: 127 MMHG

## 2023-04-29 VITALS — SYSTOLIC BLOOD PRESSURE: 110 MMHG | DIASTOLIC BLOOD PRESSURE: 69 MMHG

## 2023-04-29 VITALS — SYSTOLIC BLOOD PRESSURE: 110 MMHG | DIASTOLIC BLOOD PRESSURE: 73 MMHG

## 2023-04-29 LAB
ALBUMIN SERPL BCP-MCNC: 2.9 G/DL (ref 3.4–5)
ALP SERPL-CCNC: 87 U/L (ref 46–116)
ALT SERPL W P-5'-P-CCNC: 15 U/L (ref 12–78)
AST SERPL W P-5'-P-CCNC: 16 U/L (ref 15–37)
BILIRUB SERPL-MCNC: 0.6 MG/DL (ref 0.2–1)
BUN SERPL-MCNC: 15 MG/DL (ref 7–18)
CALCIUM SERPL-MCNC: 8.1 MG/DL (ref 8.5–10.1)
CHLORIDE SERPL-SCNC: 110 MMOL/L (ref 98–107)
CO2 SERPL-SCNC: 26 MMOL/L (ref 21–32)
CREAT SERPL-MCNC: 1.4 MG/DL (ref 0.6–1.3)
GLUCOSE SERPL-MCNC: 90 MG/DL (ref 74–106)
POTASSIUM SERPL-SCNC: 3.9 MMOL/L (ref 3.5–5.1)
PROT SERPL-MCNC: 5.8 G/DL (ref 6.4–8.2)
SODIUM SERPL-SCNC: 143 MMOL/L (ref 136–145)

## 2023-04-29 RX ADMIN — DEXTROSE MONOHYDRATE SCH MLS/HR: 50 INJECTION, SOLUTION INTRAVENOUS at 08:07

## 2023-04-29 RX ADMIN — ENOXAPARIN SODIUM SCH MG: 40 INJECTION SUBCUTANEOUS at 21:01

## 2023-04-29 RX ADMIN — SODIUM CHLORIDE PRN MLS/HR: 9 INJECTION, SOLUTION INTRAVENOUS at 20:56

## 2023-04-29 RX ADMIN — DONEPEZIL HYDROCHLORIDE SCH MG: 5 TABLET ORAL at 21:54

## 2023-04-29 RX ADMIN — ENTACAPONE SCH MG: 200 TABLET, FILM COATED ORAL at 20:57

## 2023-04-29 RX ADMIN — Medication SCH UDTAB: at 18:07

## 2023-04-29 RX ADMIN — ENTACAPONE SCH MG: 200 TABLET, FILM COATED ORAL at 14:39

## 2023-04-29 RX ADMIN — ENTACAPONE SCH MG: 200 TABLET, FILM COATED ORAL at 18:06

## 2023-04-29 RX ADMIN — Medication SCH UDTAB: at 14:38

## 2023-04-29 RX ADMIN — VITAMIN D, TAB 1000IU (100/BT) SCH UNIT: 25 TAB at 18:08

## 2023-04-29 RX ADMIN — MEROPENEM SCH MLS/HR: 500 INJECTION INTRAVENOUS at 05:50

## 2023-04-29 RX ADMIN — DEXTROSE MONOHYDRATE SCH MLS/HR: 50 INJECTION, SOLUTION INTRAVENOUS at 20:56

## 2023-04-29 RX ADMIN — Medication SCH OZ: at 08:08

## 2023-04-29 RX ADMIN — MEROPENEM SCH MLS/HR: 500 INJECTION INTRAVENOUS at 20:16

## 2023-04-29 RX ADMIN — MEROPENEM SCH MLS/HR: 500 INJECTION INTRAVENOUS at 13:35

## 2023-04-29 RX ADMIN — TAMSULOSIN HYDROCHLORIDE SCH MG: 0.4 CAPSULE ORAL at 21:54

## 2023-04-29 RX ADMIN — OXYCODONE HYDROCHLORIDE AND ACETAMINOPHEN SCH MG: 500 TABLET ORAL at 18:06

## 2023-04-29 RX ADMIN — SODIUM CHLORIDE SCH MG: 9 INJECTION, SOLUTION INTRAVENOUS at 08:06

## 2023-04-29 RX ADMIN — FOLIC ACID SCH MG: 1 TABLET ORAL at 18:07

## 2023-04-29 RX ADMIN — SODIUM CHLORIDE PRN MLS/HR: 9 INJECTION, SOLUTION INTRAVENOUS at 13:36

## 2023-04-29 RX ADMIN — Medication SCH MCG: at 18:07

## 2023-04-29 RX ADMIN — CLOZAPINE SCH MG: 25 TABLET ORAL at 18:06

## 2023-04-29 RX ADMIN — LORATADINE SCH MG: 10 TABLET ORAL at 08:08

## 2023-04-29 RX ADMIN — ATORVASTATIN CALCIUM SCH MG: 40 TABLET, FILM COATED ORAL at 21:54

## 2023-04-29 RX ADMIN — Medication SCH UDTAB: at 20:57

## 2023-04-29 RX ADMIN — Medication SCH MG: at 18:08

## 2023-04-29 NOTE — NUR
RN CLOSING NOTE

PATIENT IN BED; AWAKE, A/O X 1. STABLE ON ROOM AIR. IN NO ACUTE DISTRESS. NO S/S OF PAIN OR 
DISCOMFORT NOTED AT THIS TIME. ON TELE MONITORING WHICH READS A FLUTTER HR-88 BPM. WITH IV 
ACCESS ON LEFT FOREARM 18g; PATENT AND INTACT INFUSING WITH NS 1L REGULATED @ 125 ML/HR; 
FLUSHES WELL. SAFETY AND ASPIRATION PRECAUTIONS MAINTAINED: HOB ELEVATED, CALL LIGHT AND 
TABLE WITHIN REACH, SIDE RAILS UP X 3, BED IN LOWEST LOCKED POSITION. ENDORSED TO MORNING 
SHIFT FOR CONTINUITY OF CARE.

## 2023-04-29 NOTE — NUR
RN TELE OPENING NOTES



RECEIVED PATIENT IN BED, ASLEEP EASILY AWAKE. ON MODERATE HIGH BACK REST POSITION. A/O X 2 
WITH EPISODES OF CONFUSION. ATTACHED TO TELE MONITORING DEVICE WITH READING OF A-FLUTTER 
HR:90BPM. WITH IV ACCESS AT LFA #18G WITH NS1L AT 125ML/HR INFUSING WELL. ON MECHANICALLY 
SOFT DIET WITH ASPIRATION PRECAUTION. MO PAIN OR DISCOMFORT NOTED AT THIS TIME. NO SOB OR 
DISTRESS NOTED. KEPT BED ON LOWER LOCKED POSITION, KEPT SIDE RAILS UP X 2 ALL THE TIME,KEPT 
CALL LIGHT WITHIN AT REACH. WILL CONTINUE TO MONITOR.

## 2023-04-29 NOTE — NUR
TELE RN UPDATE NOTE (DAY SHIFT)



Patient ate 100% of his mechanical soft diet breakfast and 50mLs of nectar thickened coffee 
with 1:1 feeding assistance maintaining aspiration precautions, HOB elevated to 90 degree 
angle sitting upright.  No signs of aspiration observed.  Patient tolerated eating/drinking 
well without distress.  Spoke with his Baldemar bingham on the phone.  Will continue to 
monitor and care for patient per MD POC.

## 2023-04-29 NOTE — NUR
TELE RN OPENING NOTE (DAY SHIFT)



PATIENT IS IN BED A/O X 1 ORIENTED TO PERSON. PT HAS NO SIGNS OF PAIN NOR DISCOMFORT. NO 
RESPIRATORY DISTRESS. ON ROOM AIR TOLERATING WELL.  NO USE OF ASSESSORY MUSCLES NOTED. PT 
HAS IV ACCESS ON THE LEFT FA #18GAUGE, INFUSING NS @125ML/HR ,TOLERATING WELL. PT ON TELE 
MONITOR SHOWING CONTROLLED A-FLUTTER IN 90s TO 100s BPM RANGE. DR. CLEANING, CARDIOLOGIST IS 
AWARE.  SAFETY PRECAUTIONS MAINTAINED HOB ELEVATED FOR ASPIRATION PRECAUTIONS. BED IN LOW 
LOCKED POSITION. SIDE RAILS UP X 3. MAINTAINED ON CONTACT ISOLATION FOR SUSPECTED SCABIES.  
WILL CONTINUE TO MONITOR AND CARE FOR PT PER MD POC.

## 2023-04-29 NOTE — NUR
TELE RN CLOSING NOTE (DAY SHIFT)



PT IN BED A/O X 1 ORIENTED TO PERSON. PT HAS NO SIGNS OF PAIN NOR DISCOMFORT. NO RESPIRATORY 
DISTRESS. ON ROOM AIR TOLERATING WELL.  NO USE OF ACCESSORY MUSCLES NOTED. LS  CONTINUE TO 
HAVE  CRACKLES IN BASES.   PT CONTINUES TO HAVE INTACT, PATENT IV ACCESS ON THE RFA #18G 
INFUSING NS @125ML/HR TOLERATING WELL. PT ON TELE MONITOR SHOWING A-FIB IN THE 80s BPM 
RANGE. SAFETY PRECAUTIONS MAINTAINED WITH HOB ELEVATED FOR ASPIRATION PRECAUTIONS. PATIENT 
TOLERATING HIS MECHANICAL SOFT DIET AND NECTAR THICKENED LIQUIDS WELL WITHOUT ANY SIGNS OF 
ASPIRATION.   CONTINUING TO MAINTAIN CONTACT ISOLATION PRECAUTIONS FOR POSSIBLE SCABIES.  
BED IN LOW LOCKED POSITION. bED ALARM ON.  BED BRAKES LOCKED ON.  CALL BELL/LIGHT WITH BED 
SIDE TABLE WITHIN EASY REACH OF PATIENT.  CARDIAC ECHO COMPLETED AT BED SIDE TODAY, RESULTS 
PENDING.  CARDIOLOGY CONSULT REQUESTED DUE TO NEW ONSET OF ALTERNATING A-FIB AND A-FLUTTER 
(ASYMPTOMATIC).  VITAL SIGNS STABLE WITH ELEVATED TEMP IN AFTERNOON, 99.6F, ORAL TEMP. WILL 
ENDORSE TO NIGHT SHIFT RN FOR KEVYN.

## 2023-04-30 VITALS — SYSTOLIC BLOOD PRESSURE: 130 MMHG | DIASTOLIC BLOOD PRESSURE: 76 MMHG

## 2023-04-30 VITALS — SYSTOLIC BLOOD PRESSURE: 108 MMHG | DIASTOLIC BLOOD PRESSURE: 78 MMHG

## 2023-04-30 VITALS — SYSTOLIC BLOOD PRESSURE: 115 MMHG | DIASTOLIC BLOOD PRESSURE: 77 MMHG

## 2023-04-30 VITALS — SYSTOLIC BLOOD PRESSURE: 114 MMHG | DIASTOLIC BLOOD PRESSURE: 81 MMHG

## 2023-04-30 VITALS — SYSTOLIC BLOOD PRESSURE: 11 MMHG | DIASTOLIC BLOOD PRESSURE: 70 MMHG

## 2023-04-30 VITALS — SYSTOLIC BLOOD PRESSURE: 98 MMHG | DIASTOLIC BLOOD PRESSURE: 62 MMHG

## 2023-04-30 LAB
BASOPHILS # BLD AUTO: 0 K/UL (ref 0–0.2)
BASOPHILS NFR BLD AUTO: 0.5 % (ref 0–2)
BUN SERPL-MCNC: 13 MG/DL (ref 7–18)
CALCIUM SERPL-MCNC: 8.2 MG/DL (ref 8.5–10.1)
CHLORIDE SERPL-SCNC: 108 MMOL/L (ref 98–107)
CO2 SERPL-SCNC: 23 MMOL/L (ref 21–32)
CREAT SERPL-MCNC: 1.2 MG/DL (ref 0.6–1.3)
EOSINOPHIL NFR BLD AUTO: 0.9 % (ref 0–6)
GLUCOSE SERPL-MCNC: 112 MG/DL (ref 74–106)
HCT VFR BLD AUTO: 37 % (ref 39–51)
HGB BLD-MCNC: 12.1 G/DL (ref 13.5–17.5)
LYMPHOCYTES NFR BLD AUTO: 0.8 K/UL (ref 0.8–4.8)
LYMPHOCYTES NFR BLD AUTO: 11.2 % (ref 20–44)
MAGNESIUM SERPL-MCNC: 1.8 MG/DL (ref 1.8–2.4)
MCHC RBC AUTO-ENTMCNC: 33 G/DL (ref 31–36)
MCV RBC AUTO: 95 FL (ref 80–96)
MONOCYTES NFR BLD AUTO: 0.9 K/UL (ref 0.1–1.3)
MONOCYTES NFR BLD AUTO: 12.2 % (ref 2–12)
NEUTROPHILS # BLD AUTO: 5.3 K/UL (ref 1.8–8.9)
NEUTROPHILS NFR BLD AUTO: 75.2 % (ref 43–81)
PHOSPHATE SERPL-MCNC: 2.4 MG/DL (ref 2.5–4.9)
PLATELET # BLD AUTO: 153 K/UL (ref 150–450)
POTASSIUM SERPL-SCNC: 3.9 MMOL/L (ref 3.5–5.1)
RBC # BLD AUTO: 3.85 MIL/UL (ref 4.5–6)
SODIUM SERPL-SCNC: 139 MMOL/L (ref 136–145)
WBC NRBC COR # BLD AUTO: 7 K/UL (ref 4.3–11)

## 2023-04-30 RX ADMIN — FOLIC ACID SCH MG: 1 TABLET ORAL at 17:48

## 2023-04-30 RX ADMIN — THERA TABS SCH UDTAB: TAB at 10:31

## 2023-04-30 RX ADMIN — DONEPEZIL HYDROCHLORIDE SCH MG: 5 TABLET ORAL at 21:08

## 2023-04-30 RX ADMIN — DEXTROSE MONOHYDRATE SCH MLS/HR: 50 INJECTION, SOLUTION INTRAVENOUS at 10:43

## 2023-04-30 RX ADMIN — ATORVASTATIN CALCIUM SCH MG: 40 TABLET, FILM COATED ORAL at 21:09

## 2023-04-30 RX ADMIN — ENTACAPONE SCH MG: 200 TABLET, FILM COATED ORAL at 10:37

## 2023-04-30 RX ADMIN — Medication SCH ML: at 18:00

## 2023-04-30 RX ADMIN — SODIUM CHLORIDE PRN MLS/HR: 9 INJECTION, SOLUTION INTRAVENOUS at 18:05

## 2023-04-30 RX ADMIN — ALLOPURINOL SCH MG: 100 TABLET ORAL at 10:37

## 2023-04-30 RX ADMIN — MEROPENEM SCH MLS/HR: 500 INJECTION INTRAVENOUS at 21:08

## 2023-04-30 RX ADMIN — ASPIRIN 81 MG SCH MG: 81 TABLET ORAL at 10:32

## 2023-04-30 RX ADMIN — MEROPENEM SCH MLS/HR: 500 INJECTION INTRAVENOUS at 04:49

## 2023-04-30 RX ADMIN — Medication SCH MG: at 17:51

## 2023-04-30 RX ADMIN — ENTACAPONE SCH MG: 200 TABLET, FILM COATED ORAL at 07:17

## 2023-04-30 RX ADMIN — Medication SCH UDTAB: at 07:17

## 2023-04-30 RX ADMIN — ENTACAPONE SCH MG: 200 TABLET, FILM COATED ORAL at 17:48

## 2023-04-30 RX ADMIN — OXYCODONE HYDROCHLORIDE AND ACETAMINOPHEN SCH MG: 500 TABLET ORAL at 10:44

## 2023-04-30 RX ADMIN — VITAMIN D, TAB 1000IU (100/BT) SCH UNIT: 25 TAB at 17:51

## 2023-04-30 RX ADMIN — TAMSULOSIN HYDROCHLORIDE SCH MG: 0.4 CAPSULE ORAL at 21:09

## 2023-04-30 RX ADMIN — ENOXAPARIN SODIUM SCH MG: 40 INJECTION SUBCUTANEOUS at 21:10

## 2023-04-30 RX ADMIN — ENTACAPONE SCH MG: 200 TABLET, FILM COATED ORAL at 15:15

## 2023-04-30 RX ADMIN — Medication SCH MG: at 10:31

## 2023-04-30 RX ADMIN — CLOZAPINE SCH MG: 25 TABLET ORAL at 17:47

## 2023-04-30 RX ADMIN — Medication SCH UDTAB: at 21:09

## 2023-04-30 RX ADMIN — Medication SCH UDTAB: at 15:15

## 2023-04-30 RX ADMIN — PANTOPRAZOLE SODIUM SCH MG: 40 GRANULE, DELAYED RELEASE ORAL at 10:31

## 2023-04-30 RX ADMIN — Medication SCH MCG: at 17:48

## 2023-04-30 RX ADMIN — Medication SCH UDTAB: at 17:48

## 2023-04-30 RX ADMIN — FINASTERIDE SCH MG: 5 TABLET, FILM COATED ORAL at 10:31

## 2023-04-30 RX ADMIN — SODIUM CHLORIDE PRN MLS/HR: 9 INJECTION, SOLUTION INTRAVENOUS at 05:57

## 2023-04-30 RX ADMIN — MEROPENEM SCH MLS/HR: 500 INJECTION INTRAVENOUS at 12:18

## 2023-04-30 RX ADMIN — OXYCODONE HYDROCHLORIDE AND ACETAMINOPHEN SCH MG: 500 TABLET ORAL at 17:47

## 2023-04-30 RX ADMIN — ENTACAPONE SCH MG: 200 TABLET, FILM COATED ORAL at 21:09

## 2023-04-30 RX ADMIN — DEXTROSE MONOHYDRATE SCH MLS/HR: 50 INJECTION, SOLUTION INTRAVENOUS at 21:52

## 2023-04-30 RX ADMIN — LORATADINE SCH MG: 10 TABLET ORAL at 10:31

## 2023-04-30 RX ADMIN — Medication SCH ML: at 13:41

## 2023-04-30 RX ADMIN — Medication SCH UDTAB: at 10:37

## 2023-04-30 RX ADMIN — Medication SCH OZ: at 10:46

## 2023-04-30 NOTE — NUR
RN Opening Notes



Received pt in bed, asleep, awakens to verbal stimuli. AOX1. On RA and tolerating well. No 
SOB noted. No s/sx of respiratory distress noted. Tele monitor detects atrial fibrillation 
with rate of 75. IV access in L Hand #20G running NS @ 125 mL/hr. Safety precautions in 
place: bed in lowest, locked position, siderails upx2, and brakes on. Table and call light 
within reach. All needs met at this time.

## 2023-04-30 NOTE — NUR
RN TELE CLOSING NOTES



PATIENT IS ON BED, ASLEEP ON MODERATE HIGH BACK REST POSITION. NO PAIN OR SOB NOTED AT THIS 
TIME, ON MECHANICALLY SOFT DIET WITH ASPIRATION PRECAUTIONS. WITH IV ACCESS AT L HAND  #20G 
WITH NS AT 125ML/HR INFUSING WELL. WITH IV ANTIBIOTIC GIVEN. ON CONTACT PRECAUTIONS RELATED 
TO SCABIES. PALL DUE MEDICATIONS GIVEN, ALL NEEDS ATTENDED. KEPT BED ON LOWER LOCKED 
POSITION, KEPT SIDE RAILS UP X 2 ALL THE TIME, KEPT CALL LIGHT WITHIN AT REACH. KEPT PATIENT 
WARM AND COMFORTABLE, WILL ENDORSED TO NEXT SHIFT FOR KEVYN.

## 2023-04-30 NOTE — NUR
TELE RN OPENING NOTE (DAY SHIFT)



PATIENT RECEIVED IN BED ASLEEP, EASILY AROUSED, A/O X 1 ORIENTED TO PERSON. PT HAS NO SIGNS 
OF PAIN NOR DISCOMFORT. NO RESPIRATORY DISTRESS. ON ROOM AIR TOLERATING WELL.  NO USE OF 
ACCESSORY MUSCLES NOTED. PT HAS IV ACCESS ON THE LEFT HAND #20 GAUGE, INFUSING NS @125ML/HR 
,TOLERATING WELL. PT ON TELE MONITOR SHOWING CONTROLLED A-FIB IN THE 80s BPM RANGE.  SAFETY 
PRECAUTIONS MAINTAINED HOB ELEVATED FOR ASPIRATION PRECAUTIONS. BED IN LOW LOCKED POSITION. 
SIDE RAILS UP X 3. MAINTAINED ON CONTACT ISOLATION FOR SUSPECTED SCABIES.  WILL CONTINUE TO 
MONITOR AND CARE FOR PT PER MD POC.

## 2023-04-30 NOTE — NUR
TELE RN CLOSING NOTE (DAY SHIFT)



PT IN BED A/O X 1 ORIENTED TO PERSON. PT HAS NO SIGNS OF PAIN NOR DISCOMFORT. NO RESPIRATORY 
DISTRESS. ON ROOM AIR TOLERATING WELL.  NO USE OF ACCESSORY MUSCLES NOTED. LS  CONTINUE TO 
HAVE  CRACKLES IN BASES.   PT CONTINUES TO HAVE INTACT, PATENT IV ACCESS ON THE LEFT HAND 
#20G PIV CATHETER INFUSING NS @ 125ML/HR TOLERATING WELL.  PT ON TELE MONITOR SHOWING A-FIB 
IN THE 80s BPM RANGE. SAFETY PRECAUTIONS MAINTAINED WITH HOB ELEVATED FOR ASPIRATION 
PRECAUTIONS. PATIENT TOLERATING HIS MECHANICAL SOFT DIET AND NECTAR THICKENED LIQUIDS WELL 
WITHOUT ANY SIGNS OF ASPIRATION.   CONTINUING TO MAINTAIN CONTACT ISOLATION PRECAUTIONS FOR 
POSSIBLE SCABIES.  BED IN LOW LOCKED POSITION. BED ALARM ON.  BED BRAKES LOCKED ON.  CALL 
BELL/LIGHT WITH BED SIDE TABLE WITHIN EASY REACH OF PATIENT.  sTRAIGHT CATHETERIZED URINE 
SPECIMEN FOR REPEAT URINALYSIS AND URINE CULTURE COLLECTED AND SENT TO LAB AT 16;20 HOURS.  
VITAL SIGNS STABLE WITH ELEVATED TEMP IN AFTERNOON, 100.7 F, ORAL TEMP. BLOOD CULTURES 
ORDERED X 2 , COLLECTED B7Y LAB, RESULTS PENDING.  SPUTUM CULTURE ORDERED AND STILL NEEDS TO 
BE COLLECTED. WILL ENDORSE TO NIGHT SHIFT RN FOR KEVYN.

## 2023-05-01 VITALS — SYSTOLIC BLOOD PRESSURE: 117 MMHG | DIASTOLIC BLOOD PRESSURE: 69 MMHG

## 2023-05-01 VITALS — SYSTOLIC BLOOD PRESSURE: 121 MMHG | DIASTOLIC BLOOD PRESSURE: 76 MMHG

## 2023-05-01 VITALS — SYSTOLIC BLOOD PRESSURE: 144 MMHG | DIASTOLIC BLOOD PRESSURE: 69 MMHG

## 2023-05-01 VITALS — SYSTOLIC BLOOD PRESSURE: 137 MMHG | DIASTOLIC BLOOD PRESSURE: 88 MMHG

## 2023-05-01 VITALS — DIASTOLIC BLOOD PRESSURE: 59 MMHG | SYSTOLIC BLOOD PRESSURE: 101 MMHG

## 2023-05-01 LAB
ALBUMIN SERPL BCP-MCNC: 2.4 G/DL (ref 3.4–5)
ALP SERPL-CCNC: 74 U/L (ref 46–116)
ALT SERPL W P-5'-P-CCNC: 8 U/L (ref 12–78)
AST SERPL W P-5'-P-CCNC: 14 U/L (ref 15–37)
BASOPHILS # BLD AUTO: 0.1 K/UL (ref 0–0.2)
BASOPHILS NFR BLD AUTO: 1 % (ref 0–2)
BILIRUB SERPL-MCNC: 1 MG/DL (ref 0.2–1)
BUN SERPL-MCNC: 10 MG/DL (ref 7–18)
CALCIUM SERPL-MCNC: 7.2 MG/DL (ref 8.5–10.1)
CHLORIDE SERPL-SCNC: 111 MMOL/L (ref 98–107)
CO2 SERPL-SCNC: 22 MMOL/L (ref 21–32)
CREAT SERPL-MCNC: 0.9 MG/DL (ref 0.6–1.3)
EOSINOPHIL NFR BLD AUTO: 2 % (ref 0–6)
GLUCOSE SERPL-MCNC: 86 MG/DL (ref 74–106)
HCT VFR BLD AUTO: 37 % (ref 39–51)
HGB BLD-MCNC: 12.3 G/DL (ref 13.5–17.5)
LYMPHOCYTES NFR BLD AUTO: 0.9 K/UL (ref 0.8–4.8)
LYMPHOCYTES NFR BLD AUTO: 14.8 % (ref 20–44)
MAGNESIUM SERPL-MCNC: 1.7 MG/DL (ref 1.8–2.4)
MCHC RBC AUTO-ENTMCNC: 34 G/DL (ref 31–36)
MCV RBC AUTO: 95 FL (ref 80–96)
MONOCYTES NFR BLD AUTO: 1 K/UL (ref 0.1–1.3)
MONOCYTES NFR BLD AUTO: 16.4 % (ref 2–12)
NEUTROPHILS # BLD AUTO: 3.9 K/UL (ref 1.8–8.9)
NEUTROPHILS NFR BLD AUTO: 65.8 % (ref 43–81)
PHOSPHATE SERPL-MCNC: 2.3 MG/DL (ref 2.5–4.9)
PLATELET # BLD AUTO: 140 K/UL (ref 150–450)
POTASSIUM SERPL-SCNC: 3.1 MMOL/L (ref 3.5–5.1)
PROT SERPL-MCNC: 5.2 G/DL (ref 6.4–8.2)
RBC # BLD AUTO: 3.87 MIL/UL (ref 4.5–6)
SODIUM SERPL-SCNC: 144 MMOL/L (ref 136–145)
WBC NRBC COR # BLD AUTO: 5.9 K/UL (ref 4.3–11)

## 2023-05-01 RX ADMIN — Medication SCH UDTAB: at 07:45

## 2023-05-01 RX ADMIN — DEXTROSE MONOHYDRATE SCH MLS/HR: 50 INJECTION, SOLUTION INTRAVENOUS at 09:19

## 2023-05-01 RX ADMIN — ASPIRIN 81 MG SCH MG: 81 TABLET ORAL at 09:17

## 2023-05-01 RX ADMIN — ENTACAPONE SCH MG: 200 TABLET, FILM COATED ORAL at 07:45

## 2023-05-01 RX ADMIN — ENTACAPONE SCH MG: 200 TABLET, FILM COATED ORAL at 11:32

## 2023-05-01 RX ADMIN — OXYCODONE HYDROCHLORIDE AND ACETAMINOPHEN SCH MG: 500 TABLET ORAL at 09:17

## 2023-05-01 RX ADMIN — Medication SCH OZ: at 09:00

## 2023-05-01 RX ADMIN — LORATADINE SCH MG: 10 TABLET ORAL at 09:18

## 2023-05-01 RX ADMIN — SODIUM CHLORIDE PRN MLS/HR: 9 INJECTION, SOLUTION INTRAVENOUS at 03:16

## 2023-05-01 RX ADMIN — Medication SCH ML: at 13:32

## 2023-05-01 RX ADMIN — ALLOPURINOL SCH MG: 100 TABLET ORAL at 09:18

## 2023-05-01 RX ADMIN — ENTACAPONE SCH MG: 200 TABLET, FILM COATED ORAL at 15:13

## 2023-05-01 RX ADMIN — MEROPENEM SCH MLS/HR: 500 INJECTION INTRAVENOUS at 05:20

## 2023-05-01 RX ADMIN — PANTOPRAZOLE SODIUM SCH MG: 40 GRANULE, DELAYED RELEASE ORAL at 09:17

## 2023-05-01 RX ADMIN — Medication SCH UDTAB: at 15:13

## 2023-05-01 RX ADMIN — FINASTERIDE SCH MG: 5 TABLET, FILM COATED ORAL at 09:18

## 2023-05-01 RX ADMIN — THERA TABS SCH UDTAB: TAB at 09:18

## 2023-05-01 RX ADMIN — MEROPENEM SCH MLS/HR: 500 INJECTION INTRAVENOUS at 13:31

## 2023-05-01 RX ADMIN — Medication SCH UDTAB: at 11:32

## 2023-05-01 RX ADMIN — Medication SCH MG: at 09:18

## 2023-05-01 RX ADMIN — Medication SCH ML: at 08:03

## 2023-05-01 NOTE — NUR
DISCHARGE RN NOTES:



PT DC TO [facility], PT ALERT AND ORIENTED X 2 AND ABLE TO MAKE NEEDS KNOWN. NO SOB 
OR CARDIAC DISTRESS NOTED, TELE MONITOR HANDED TO US ALFRED. REPORT GIVEN TO SHARON MOCK. 
DISCHARGE PACKET GIVEN TO EMT. CELLPHONE AND CELPHONE  GIVEN TO PT/EMT. IV ACCESS ON 
LEFT HAND IN PLACE PT WILL CONTINUE IV ATB IN THE SNF. PT LEFT THE UNIT STABLE, ACCOMPANIED 
BY2 EMTS VIA [facility].

## 2023-05-01 NOTE — NUR
RN Closing Notes



Pt in bed, asleep, awakens to verbal stimuli. AOX1. On RA and tolerating well. No SOB noted. 
No s/sx of respiratory distress noted. Tele monitor detects atrial fibrillation with rate of 
75. IV access in L Hand #20G running NS @ 125 mL/hr. All orders carried out. All needs met. 
Pt kept clean and dry. Safety precautions in place: bed in lowest, locked position, 
siderails upx2, and brakes on. Table and call light within reach. Will endorse to oncoming 
shift for KEVYN.

## 2023-05-01 NOTE — NUR
ATTEMPTED TO COLLECT SPUTUM SAMPLE FROM PT. DID NOT TOLERATE NTS. PT EXPECTORATED BLOOD. 
VERBALLY TOLD MYSELF, ANGY NIEVES AND  TO "GO F*CK YOURSELVES." RN AND CHARGE RN NOTIFIED 
THAT PT IS NOT COOPERATIVE AND COMBATIVE.